# Patient Record
Sex: MALE | Race: OTHER | NOT HISPANIC OR LATINO
[De-identification: names, ages, dates, MRNs, and addresses within clinical notes are randomized per-mention and may not be internally consistent; named-entity substitution may affect disease eponyms.]

---

## 2017-10-26 ENCOUNTER — TRANSCRIPTION ENCOUNTER (OUTPATIENT)
Age: 52
End: 2017-10-26

## 2023-06-07 ENCOUNTER — NON-APPOINTMENT (OUTPATIENT)
Age: 58
End: 2023-06-07

## 2023-06-07 PROBLEM — Z00.00 ENCOUNTER FOR PREVENTIVE HEALTH EXAMINATION: Status: ACTIVE | Noted: 2023-06-07

## 2023-06-08 ENCOUNTER — APPOINTMENT (OUTPATIENT)
Dept: CARDIOLOGY | Facility: CLINIC | Age: 58
End: 2023-06-08
Payer: MEDICAID

## 2023-06-08 VITALS
BODY MASS INDEX: 24.77 KG/M2 | OXYGEN SATURATION: 98 % | SYSTOLIC BLOOD PRESSURE: 112 MMHG | RESPIRATION RATE: 16 BRPM | WEIGHT: 173 LBS | HEIGHT: 70 IN | DIASTOLIC BLOOD PRESSURE: 70 MMHG | HEART RATE: 92 BPM

## 2023-06-08 DIAGNOSIS — Z72.3 LACK OF PHYSICAL EXERCISE: ICD-10-CM

## 2023-06-08 DIAGNOSIS — R45.89 OTHER SYMPTOMS AND SIGNS INVOLVING EMOTIONAL STATE: ICD-10-CM

## 2023-06-08 DIAGNOSIS — Z87.09 PERSONAL HISTORY OF OTHER DISEASES OF THE RESPIRATORY SYSTEM: ICD-10-CM

## 2023-06-08 DIAGNOSIS — Z87.891 PERSONAL HISTORY OF NICOTINE DEPENDENCE: ICD-10-CM

## 2023-06-08 DIAGNOSIS — M54.9 DORSALGIA, UNSPECIFIED: ICD-10-CM

## 2023-06-08 DIAGNOSIS — R41.3 OTHER AMNESIA: ICD-10-CM

## 2023-06-08 DIAGNOSIS — G89.29 DORSALGIA, UNSPECIFIED: ICD-10-CM

## 2023-06-08 DIAGNOSIS — S09.90XS OTHER SYMPTOMS AND SIGNS INVOLVING EMOTIONAL STATE: ICD-10-CM

## 2023-06-08 PROCEDURE — 93000 ELECTROCARDIOGRAM COMPLETE: CPT

## 2023-06-08 PROCEDURE — 99204 OFFICE O/P NEW MOD 45 MIN: CPT | Mod: 25

## 2023-06-08 RX ORDER — BUPROPION HYDROCHLORIDE 150 MG/1
150 TABLET, FILM COATED, EXTENDED RELEASE ORAL
Qty: 180 | Refills: 3 | Status: ACTIVE | COMMUNITY

## 2023-06-08 RX ORDER — BUPROPION HYDROCHLORIDE 100 MG/1
TABLET, FILM COATED ORAL
Refills: 0 | Status: DISCONTINUED | COMMUNITY
End: 2023-06-08

## 2023-06-08 RX ORDER — MODAFINIL 200 MG/1
200 TABLET ORAL DAILY
Qty: 90 | Refills: 0 | Status: ACTIVE | COMMUNITY

## 2023-06-08 RX ORDER — BREXPIPRAZOLE 1 MG/1
1 TABLET ORAL
Qty: 90 | Refills: 0 | Status: ACTIVE | COMMUNITY

## 2023-06-08 RX ORDER — MELOXICAM 15 MG/1
15 TABLET ORAL
Qty: 30 | Refills: 3 | Status: ACTIVE | COMMUNITY
Start: 2023-06-08 | End: 1900-01-01

## 2023-06-08 RX ORDER — ALBUTEROL 90 MCG
90 AEROSOL (GRAM) INHALATION
Refills: 0 | Status: DISCONTINUED | COMMUNITY
End: 2023-06-08

## 2023-06-08 RX ORDER — PREGABALIN 200 MG/1
200 CAPSULE ORAL
Qty: 90 | Refills: 0 | Status: ACTIVE | COMMUNITY

## 2023-06-08 RX ORDER — MELOXICAM 15 MG/1
15 TABLET ORAL DAILY
Qty: 30 | Refills: 0 | Status: DISCONTINUED | COMMUNITY
End: 2023-06-08

## 2023-06-08 RX ORDER — FLUTICASONE FUROATE, UMECLIDINIUM BROMIDE AND VILANTEROL TRIFENATATE 200; 62.5; 25 UG/1; UG/1; UG/1
200-62.5-25 POWDER RESPIRATORY (INHALATION)
Refills: 0 | Status: DISCONTINUED | COMMUNITY
End: 2023-06-08

## 2023-06-08 RX ORDER — FLUOXETINE HYDROCHLORIDE 20 MG/1
20 CAPSULE ORAL
Qty: 90 | Refills: 0 | Status: ACTIVE | COMMUNITY

## 2023-06-08 RX ORDER — DONEPEZIL HYDROCHLORIDE 5 MG/1
5 TABLET ORAL DAILY
Qty: 90 | Refills: 3 | Status: ACTIVE | COMMUNITY

## 2023-06-08 RX ORDER — FLUOXETINE HYDROCHLORIDE 60 MG/1
TABLET ORAL
Refills: 0 | Status: DISCONTINUED | COMMUNITY
End: 2023-06-08

## 2023-06-08 NOTE — REASON FOR VISIT
[FreeTextEntry1] : 57-year-old male presents here for cardiac evaluation with concerns about a chest pain syndrome beginning approximately 1 month ago.  The discomfort is described as a pressure-like sensation upper chest with some radiation to the left arm.  It is nonexertional, associated with no other symptoms, and seems to occur at variable times every 2 to 3 days.\par \par Patient's primary medical history is that of severe head trauma when a 50 pound piece of iron struck him in the head approximately 2 years ago resulting in severe concussion with postconcussive syndrome.  He has since had variable problems with memory and mood disorders.\par \par He denies any known history of hypertension, diabetes.  He does have hyperlipidemia and smoked until 2008.  He is unaware of any family history of coronary artery disease though did have a mother and a sister with a tachycardia.\par \par Patient is highly sedentary sleeping upwards of 16 hours a day and finds that when he does walk he becomes winded very easily.\par \par No PND, orthopnea or edema.  No syncope or near syncope.

## 2023-06-08 NOTE — REVIEW OF SYSTEMS
[Headache] : no headache [Feeling Fatigued] : feeling fatigued [SOB] : shortness of breath [Dyspnea on exertion] : dyspnea during exertion [Chest Discomfort] : chest discomfort [Lower Ext Edema] : no extremity edema [Leg Claudication] : no intermittent leg claudication [Palpitations] : no palpitations [Syncope] : no syncope [Joint Pain] : joint pain [Memory Lapses Or Loss] : memory lapses or loss [Depression] : depression [Anxiety] : anxiety [Under Stress] : under stress [Negative] : Heme/Lymph [FreeTextEntry2] : Other than as documented here and in the HPI, the thirteen point ROS is negative

## 2023-06-08 NOTE — ASSESSMENT
[FreeTextEntry1] : ECG: Normal sinus rhythm at 72.  Poor progression.  Diffuse nonspecific ST and T wave changes.\par \par Laboratory data:\par ----6/6/2023:\par Chol----143\par HDL-----32\par LDL-----65\par Trigs---232\par D0a---5.7\par \par Impression:\par 1.  Chest pain syndrome that seems to have atypical features and may be musculoskeletal in nature.\par \par 2.  Cardiac risk factors of hyperlipidemia which appears reasonably controlled though triglycerides are high and borderline A1c increase with remote tobacco use.\par \par 3.  Poor functional capacity with a variety of neuropsychiatric problems stemming from severe head trauma 2 years ago.  Patient describes a high degree of somnolence and fatigue during the day possibly due to polypharmacy.\par \par 4.  Abnormal ECG as described above.\par \par Plan:\par 1.  Address chest pain with meloxicam 15 mg a day and ice.\par \par 2 investigate ECG abnormalities with an echocardiogram\par \par 3 risk stratify with an exercise stress test.\par \par 4.  May want to consider a sleep study and or psychiatric reevaluation of his current regimen

## 2023-06-08 NOTE — PHYSICAL EXAM
[de-identified] :                    Well appearing and nourished with no obvious deformities or distress.\par \par Eyes: \par No conjunctival injection and no xanthelasmas.\par HEENT: \par Normocephalic.Normal oral mucosa. No pallor or cyanosis\par Neck: \par No jugular venous distension. with normal A and V wave forms. No palpable adenopathy.\par Cardiovascular: Palpation of the left anterior chest wall reveals focal areas of substantial tenderness.\par Normal rate and rhythm with normal S1, S2 and a grade 1/6 systolic murmur. Distal arterial pulses are normal. No significant peripheral edema.\par Pulmonary: \par Lungs are clear to auscultation and percussion. Normal respiratory pattern without any accessory muscle use\par Abdomen: \par Soft, non-tender ; no palpable organomegaly or masses.\par Extremities:\par No digital clubbing, cyanosis or ischemic changes.\par Skin: \par No skin lesions, rashes, ulcers or xanthomas.\par Psychiatric: \par Alert and oriented to person, place and time. Appropriate mood and affect.

## 2023-06-09 ENCOUNTER — OFFICE (OUTPATIENT)
Dept: URBAN - METROPOLITAN AREA CLINIC 63 | Facility: CLINIC | Age: 58
Setting detail: OPHTHALMOLOGY
End: 2023-06-09
Payer: MEDICAID

## 2023-06-09 DIAGNOSIS — H40.013: ICD-10-CM

## 2023-06-09 DIAGNOSIS — H11.153: ICD-10-CM

## 2023-06-09 DIAGNOSIS — H25.13: ICD-10-CM

## 2023-06-09 PROCEDURE — 92004 COMPRE OPH EXAM NEW PT 1/>: CPT | Performed by: STUDENT IN AN ORGANIZED HEALTH CARE EDUCATION/TRAINING PROGRAM

## 2023-06-09 ASSESSMENT — AXIALLENGTH_DERIVED
OS_AL: 22.3178
OD_AL: 22.0764
OD_AL: 22.1191

## 2023-06-09 ASSESSMENT — LID EXAM ASSESSMENTS
OS_MEIBOMITIS: LLL 1+
OD_MEIBOMITIS: RLL 1+

## 2023-06-09 ASSESSMENT — KERATOMETRY
OS_AXISANGLE_DEGREES: 077
OD_K2POWER_DIOPTERS: 45.75
OD_AXISANGLE_DEGREES: 102
OD_K1POWER_DIOPTERS: 44.50
OS_K1POWER_DIOPTERS: 44.00
OS_K2POWER_DIOPTERS: 44.50

## 2023-06-09 ASSESSMENT — TONOMETRY
OS_IOP_MMHG: 19
OD_IOP_MMHG: 17

## 2023-06-09 ASSESSMENT — REFRACTION_AUTOREFRACTION
OS_SPHERE: +3.00
OD_SPHERE: +3.00
OD_AXIS: 055
OS_AXIS: 088
OS_CYLINDER: -0.50
OD_CYLINDER: -0.75

## 2023-06-09 ASSESSMENT — SPHEQUIV_DERIVED
OS_SPHEQUIV: 2.75
OD_SPHEQUIV: 2.5
OD_SPHEQUIV: 2.625

## 2023-06-09 ASSESSMENT — REFRACTION_CURRENTRX
OD_AXIS: 071
OD_CYLINDER: -1.00
OD_OVR_VA: 20/
OD_SPHERE: +3.50
OS_AXIS: 180
OS_OVR_VA: 20/
OD_AXIS: 077
OD_SPHERE: +3.75
OD_OVR_VA: 20/
OD_VPRISM_DIRECTION: SV
OS_SPHERE: +2.25
OS_OVR_VA: 20/
OS_AXIS: 180
OS_VPRISM_DIRECTION: SV
OS_CYLINDER: 0.00
OD_CYLINDER: -1.00
OS_SPHERE: +2.25
OS_CYLINDER: 0.00

## 2023-06-09 ASSESSMENT — REFRACTION_MANIFEST
OD_SPHERE: +3.00
OS_SPHERE: +2.25
OD_AXIS: 70
OD_VA1: 20/20
OS_VA1: 20/20
OD_CYLINDER: -1.00

## 2023-06-09 ASSESSMENT — CONFRONTATIONAL VISUAL FIELD TEST (CVF)
OS_FINDINGS: FULL
OD_FINDINGS: FULL

## 2023-06-09 ASSESSMENT — VISUAL ACUITY
OD_BCVA: 20/20
OS_BCVA: 20/25

## 2023-06-13 ENCOUNTER — APPOINTMENT (OUTPATIENT)
Dept: ORTHOPEDIC SURGERY | Facility: CLINIC | Age: 58
End: 2023-06-13
Payer: MEDICAID

## 2023-06-13 VITALS — WEIGHT: 173 LBS | BODY MASS INDEX: 24.77 KG/M2 | HEIGHT: 70 IN

## 2023-06-13 DIAGNOSIS — M75.01 ADHESIVE CAPSULITIS OF RIGHT SHOULDER: ICD-10-CM

## 2023-06-13 DIAGNOSIS — M75.02 ADHESIVE CAPSULITIS OF RIGHT SHOULDER: ICD-10-CM

## 2023-06-13 PROCEDURE — 99203 OFFICE O/P NEW LOW 30 MIN: CPT | Mod: 25

## 2023-06-13 PROCEDURE — 20610 DRAIN/INJ JOINT/BURSA W/O US: CPT | Mod: 50

## 2023-06-13 NOTE — PHYSICAL EXAM
[] : tenderness at lateral shoulder [Bilateral] : shoulder bilaterally [Outside films reviewed] : Outside films reviewed [There are no fractures, subluxations or dislocations. No significant abnormalities are seen] : There are no fractures, subluxations or dislocations. No significant abnormalities are seen [TWNoteComboBox7] : active forward flexion 90 degrees [de-identified] : active abduction 60 degrees [TWNoteComboBox6] : internal rotation lateral hip [de-identified] : external rotation 10 degrees

## 2023-06-13 NOTE — HISTORY OF PRESENT ILLNESS
[Neck] : neck [8] : 8 [0] : 0 [Dull/Aching] : dull/aching [Radiating] : radiating [Sharp] : sharp [Sleep] : sleep [de-identified] : Has b/l shoulder pain for past 6 months. No injury. No h/o diabetes. Has other neck and back issues, but doesn't c/o any other joints [] : no [FreeTextEntry1] : Bilateral Shoulder [FreeTextEntry5] : Shoulder pain started six months ago and neck pain started a couple of years ago from falling at work.2 months went to neurosurgeon had gotten an MRI.   [FreeTextEntry7] : Bilateral upper arms [de-identified] : extending, turning, bending

## 2023-06-13 NOTE — PROCEDURE
[Large Joint Injection] : Large joint injection [Bilateral] : bilaterally of the [Subacromial Space] : subacromial space [Pain] : pain [Alcohol] : alcohol [Betadine] : betadine [Ethyl Chloride sprayed topically] : ethyl chloride sprayed topically [Sterile technique used] : sterile technique used [___ cc    3mg] :  Betamethasone (Celestone) ~Vcc of 3mg [___ cc    1%] : Lidocaine ~Vcc of 1%

## 2023-06-13 NOTE — ASSESSMENT
[FreeTextEntry1] : Will inject both shoulders with cortisone, start PT to try to regain some ROM\par Needs to see Rheumatologist to r/o PMR or other rheumatologic condition

## 2023-07-20 ENCOUNTER — APPOINTMENT (OUTPATIENT)
Dept: CARDIOLOGY | Facility: CLINIC | Age: 58
End: 2023-07-20
Payer: MEDICAID

## 2023-07-20 PROCEDURE — 93015 CV STRESS TEST SUPVJ I&R: CPT

## 2023-07-20 PROCEDURE — 93306 TTE W/DOPPLER COMPLETE: CPT

## 2023-08-09 ENCOUNTER — APPOINTMENT (OUTPATIENT)
Dept: CARDIOLOGY | Facility: CLINIC | Age: 58
End: 2023-08-09
Payer: MEDICAID

## 2023-08-09 VITALS
WEIGHT: 164 LBS | HEART RATE: 87 BPM | DIASTOLIC BLOOD PRESSURE: 70 MMHG | BODY MASS INDEX: 23.48 KG/M2 | SYSTOLIC BLOOD PRESSURE: 100 MMHG | RESPIRATION RATE: 15 BRPM | OXYGEN SATURATION: 97 % | HEIGHT: 70 IN

## 2023-08-09 DIAGNOSIS — R06.02 SHORTNESS OF BREATH: ICD-10-CM

## 2023-08-09 DIAGNOSIS — R94.31 ABNORMAL ELECTROCARDIOGRAM [ECG] [EKG]: ICD-10-CM

## 2023-08-09 DIAGNOSIS — R07.9 CHEST PAIN, UNSPECIFIED: ICD-10-CM

## 2023-08-09 DIAGNOSIS — R07.89 OTHER CHEST PAIN: ICD-10-CM

## 2023-08-09 PROCEDURE — 99214 OFFICE O/P EST MOD 30 MIN: CPT

## 2023-08-09 NOTE — REVIEW OF SYSTEMS
[Feeling Fatigued] : feeling fatigued [SOB] : shortness of breath [Dyspnea on exertion] : dyspnea during exertion [Chest Discomfort] : chest discomfort [Joint Pain] : joint pain [Memory Lapses Or Loss] : memory lapses or loss [Depression] : depression [Anxiety] : anxiety [Under Stress] : under stress [Negative] : Heme/Lymph [Headache] : no headache [Lower Ext Edema] : no extremity edema [Leg Claudication] : no intermittent leg claudication [Palpitations] : no palpitations [Syncope] : no syncope [FreeTextEntry2] : Other than as documented here and in the HPI, the thirteen point ROS is negative

## 2023-08-09 NOTE — REASON FOR VISIT
[FreeTextEntry1] : 57-year-old male presents here for cardiac evaluation with concerns about a non- exertonal chest pain syndrome, described as a pressure-like sensation upper chest with some radiation to the left arm.  Meloxicam was prescribed but for some reason he was never able to get it. The chest pain may be slightly improved  Patient's primary medical history is that of severe head trauma when a 50 pound piece of iron struck him in the head approximately 2 years ago resulting in severe concussion with post concussive syndrome.  He has since had variable problems with memory and mood disorders.  He denies any known history of hypertension, diabetes.  He does have hyperlipidemia and smoked until 2008.  He is unaware of any family history of coronary artery disease though did have a mother and a sister with a tachycardia.  Patient is highly sedentary sleeping upwards of 16 hours a day and finds that when he does walk he becomes winded very easily.  No PND, orthopnea or edema.  No syncope or near syncope.

## 2023-08-09 NOTE — ASSESSMENT
[FreeTextEntry1] : ECG: Normal sinus rhythm at 72.  Poor R wave progression.  Diffuse nonspecific ST and T wave changes.  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  Exercise stress test 7/20/2023: Time: 7 minutes 52 seconds (9 METS) Heart rate 164 (102%) Blood pressure hypertensive 192/74 ECG: Abnormal ST segment depression which is nondiagnostic in view of some baseline ST changes   Impression: 1.  Chest pain syndrome that seems to have atypical features however etc. stress testing shows mild ST segment abnormalities that are nondiagnostic.  2.  Cardiac risk factors of hyperlipidemia which appears reasonably controlled though triglycerides are high and borderline A1c increase with remote tobacco use.  3.  Diminished functional capacity with a variety of neuropsychiatric problems stemming from severe head trauma 2 years ago.  Patient describes a high degree of somnolence and fatigue during the day possibly due to polypharmacy.  4.  Abnormal ECG as described above.  Echocardiographically normal LV function and no significant valve disease.  Plan: 1.  Address chest pain with meloxicam 15 mg a day and ice.  2.  Reinforced appropriate dietary measures  3 patient encouraged to contact me should symptoms accelerate or progress in any way  4.  May want to consider a sleep study and or psychiatric reevaluation of his current regimen

## 2023-08-09 NOTE — HISTORY OF PRESENT ILLNESS
[FreeTextEntry1] : Patient was referred for exercise stress testing and echocardiography which will be reviewed below.  States that his diet is quite strict and has eliminated most sources of fats and carbohydrates.

## 2023-08-09 NOTE — PHYSICAL EXAM
[de-identified] :                    Well appearing and nourished with no obvious deformities or distress.\par  \par  Eyes: \par  No conjunctival injection and no xanthelasmas.\par  HEENT: \par  Normocephalic.Normal oral mucosa. No pallor or cyanosis\par  Neck: \par  No jugular venous distension. with normal A and V wave forms. No palpable adenopathy.\par  Cardiovascular: Palpation of the left anterior chest wall reveals focal areas of substantial tenderness.\par  Normal rate and rhythm with normal S1, S2 and a grade 1/6 systolic murmur. Distal arterial pulses are normal. No significant peripheral edema.\par  Pulmonary: \par  Lungs are clear to auscultation and percussion. Normal respiratory pattern without any accessory muscle use\par  Abdomen: \par  Soft, non-tender ; no palpable organomegaly or masses.\par  Extremities:\par  No digital clubbing, cyanosis or ischemic changes.\par  Skin: \par  No skin lesions, rashes, ulcers or xanthomas.\par  Psychiatric: \par  Alert and oriented to person, place and time. Appropriate mood and affect.

## 2023-10-18 ENCOUNTER — APPOINTMENT (OUTPATIENT)
Dept: CARDIOLOGY | Facility: CLINIC | Age: 58
End: 2023-10-18